# Patient Record
Sex: FEMALE | Race: WHITE
[De-identification: names, ages, dates, MRNs, and addresses within clinical notes are randomized per-mention and may not be internally consistent; named-entity substitution may affect disease eponyms.]

---

## 2018-01-30 ENCOUNTER — HOSPITAL ENCOUNTER (EMERGENCY)
Dept: HOSPITAL 26 - MED | Age: 18
LOS: 1 days | Discharge: HOME | End: 2018-01-31
Payer: MEDICAID

## 2018-01-30 VITALS — WEIGHT: 118 LBS | HEIGHT: 68 IN | BODY MASS INDEX: 17.88 KG/M2

## 2018-01-30 VITALS — DIASTOLIC BLOOD PRESSURE: 72 MMHG | SYSTOLIC BLOOD PRESSURE: 124 MMHG

## 2018-01-30 DIAGNOSIS — R06.4: Primary | ICD-10-CM

## 2018-01-30 DIAGNOSIS — R94.31: ICD-10-CM

## 2018-01-30 PROCEDURE — 96372 THER/PROPH/DIAG INJ SC/IM: CPT

## 2018-01-30 PROCEDURE — 93005 ELECTROCARDIOGRAM TRACING: CPT

## 2018-01-30 PROCEDURE — 71046 X-RAY EXAM CHEST 2 VIEWS: CPT

## 2018-01-30 PROCEDURE — 99284 EMERGENCY DEPT VISIT MOD MDM: CPT

## 2018-01-30 NOTE — NUR
17Y F BIB MOM C/O TACHYCARDIA X 1 NIGHT. PT DENIES ANY USE OF ILLICIT DRUGS OR 
CAFFEINE OR ENERGY DRINKS. PT STATES RAPID HEART ARRIVED SPONTANEOUSLY. PT 
AAOX4. PT DENIES ANY N/V/D AT THE MOMENT. PT DENIES ANY MEDICAL HX OR 
ALLERGIES. PT AMBULATED TO ER BED WITH STEADY GAIT.

## 2018-01-31 VITALS — SYSTOLIC BLOOD PRESSURE: 136 MMHG | DIASTOLIC BLOOD PRESSURE: 71 MMHG

## 2018-01-31 NOTE — NUR
Patient discharged with v/s stable. Written and verbal after care instructions 
given and explained. Patient alert, oriented and verbalized understanding of 
instructions. Ambulatory with steady gait. All questions addressed prior to 
discharge. ID band removed. Patient advised to follow up with PMD. Rx of XANAX 
0.5MG given. Patient educated on indication of medication including possible 
reaction and side effects. Opportunity to ask questions provided and answered.

## 2018-02-04 ENCOUNTER — HOSPITAL ENCOUNTER (EMERGENCY)
Dept: HOSPITAL 26 - MED | Age: 18
LOS: 1 days | Discharge: HOME | End: 2018-02-05
Payer: MEDICAID

## 2018-02-04 VITALS — SYSTOLIC BLOOD PRESSURE: 123 MMHG | DIASTOLIC BLOOD PRESSURE: 68 MMHG

## 2018-02-04 VITALS — BODY MASS INDEX: 18.21 KG/M2 | HEIGHT: 68 IN | WEIGHT: 120.13 LBS

## 2018-02-04 DIAGNOSIS — M94.0: Primary | ICD-10-CM

## 2018-02-04 NOTE — NUR
17/F CAME IN W C/O 2/10 SUBSTERNAL CHEST PAIN RADIATING TO BACK AND SOB STARTED 
AT 1800 TODAY. PT STATES SHE WAS SEEN HERE FOR SIMILAR SX AND WAS GIVEN XANAX 
PRN. REPORTS SHE STOPPED TAKING XANAX BECAUSE IT MAKES HER FEEL DROWSY. DENIES 
TRAUMA/INJURY, DENIES FEVER/CHILLS, N/V/D. ALL LUNG SOUNDS CBTA, 20RR EVEN AND 
UNLABORED, ABLE TO SPEAK FULL LENGTH SENTENCES WITHOUT DIFFICULTY. DENIES OTHER 
PMH/RX/OTC

## 2018-02-05 VITALS — SYSTOLIC BLOOD PRESSURE: 108 MMHG | DIASTOLIC BLOOD PRESSURE: 64 MMHG
